# Patient Record
Sex: MALE | NOT HISPANIC OR LATINO | URBAN - METROPOLITAN AREA
[De-identification: names, ages, dates, MRNs, and addresses within clinical notes are randomized per-mention and may not be internally consistent; named-entity substitution may affect disease eponyms.]

---

## 2018-11-15 ENCOUNTER — SURGERY - HEALTHEAST (OUTPATIENT)
Dept: SURGERY | Facility: CLINIC | Age: 33
End: 2018-11-15

## 2018-11-15 ENCOUNTER — SURGERY - HEALTHEAST (OUTPATIENT)
Dept: SURGERY | Facility: HOSPITAL | Age: 33
End: 2018-11-15

## 2018-11-15 ENCOUNTER — ANESTHESIA - HEALTHEAST (OUTPATIENT)
Dept: SURGERY | Facility: HOSPITAL | Age: 33
End: 2018-11-15

## 2018-11-15 ASSESSMENT — MIFFLIN-ST. JEOR
SCORE: 1911.74
SCORE: 1951.44

## 2021-06-02 VITALS — BODY MASS INDEX: 30.38 KG/M2 | WEIGHT: 217 LBS | HEIGHT: 71 IN

## 2021-06-16 PROBLEM — K37 APPENDICITIS: Status: ACTIVE | Noted: 2018-11-15

## 2021-06-21 NOTE — ANESTHESIA POSTPROCEDURE EVALUATION
Patient: Amanuel Thomas  APPENDECTOMY, LAPAROSCOPIC  Anesthesia type: general    Patient location: [facility] Surgical Floor  Last vitals:   Vitals:    11/15/18 2256   BP: 138/75   Pulse: (!) 106   Resp: 20   Temp: 37.2  C (99  F)   SpO2: 96%     Post vital signs: stable  Level of consciousness: awake and responds to simple questions  Post-anesthesia pain: pain controlled  Post-anesthesia nausea and vomiting: no  Pulmonary: unassisted, return to baseline  Cardiovascular: stable and blood pressure at baseline  Hydration: adequate  Anesthetic events: no    QCDR Measures:  ASA# 11 - Ro-op Cardiac Arrest: ASA11B - Patient did NOT experience unanticipated cardiac arrest  ASA# 12 - Ro-op Mortality Rate: ASA12B - Patient did NOT die  ASA# 13 - PACU Re-Intubation Rate: ASA13B - Patient did NOT require a new airway mgmt  ASA# 10 - Composite Anes Safety: ASA10A - No serious adverse event    Additional Notes:

## 2021-06-21 NOTE — ANESTHESIA CARE TRANSFER NOTE
Last vitals:   Vitals:    11/15/18 2018   BP: 144/89   Pulse: (!) 102   Resp: 18   Temp: 37.7  C (99.8  F)   SpO2: 100%     Patient's level of consciousness is drowsy  Spontaneous respirations: yes  Maintains airway independently: yes  Dentition unchanged: yes  Oropharynx: oropharynx clear of all foreign objects    QCDR Measures:  ASA# 20 - Surgical Safety Checklist: WHO surgical safety checklist completed prior to induction    PQRS# 430 - Adult PONV Prevention: 4558F - Pt received => 2 anti-emetic agents (different classes) preop & intraop  ASA# 8 - Peds PONV Prevention: NA - Not pediatric patient, not GA or 2 or more risk factors NOT present  PQRS# 424 - Ro-op Temp Management: 4559F - At least one body temp DOCUMENTED => 35.5C or 95.9F within required timeframe  PQRS# 426 - PACU Transfer Protocol: - Transfer of care checklist used  ASA# 14 - Acute Post-op Pain: ASA14B - Patient did NOT experience pain >= 7 out of 10

## 2021-06-21 NOTE — ANESTHESIA PREPROCEDURE EVALUATION
Anesthesia Evaluation      Patient summary reviewed   No history of anesthetic complications     Airway   Mallampati: II  Neck ROM: full   Pulmonary - normal exam    breath sounds clear to auscultation  (-) sleep apnea                         Cardiovascular - normal exam  Exercise tolerance: > or = 4 METS  (+) hypertension, ,     (-) angina  Rhythm: regular  Rate: normal,         Neuro/Psych - negative ROS     Endo/Other       Comments: appendicitis    GI/Hepatic/Renal    (+) GERD well controlled,             Dental - normal exam                        Anesthesia Plan  Planned anesthetic: general endotracheal    ASA 2 - emergent   Induction: intravenous   Anesthetic plan and risks discussed with: patient and spouse  Anesthesia plan special considerations: rapid sequence induction, antiemetics,   Post-op plan: routine recovery